# Patient Record
Sex: FEMALE | Race: WHITE | URBAN - METROPOLITAN AREA
[De-identification: names, ages, dates, MRNs, and addresses within clinical notes are randomized per-mention and may not be internally consistent; named-entity substitution may affect disease eponyms.]

---

## 2023-10-13 ENCOUNTER — OFFICE VISIT (OUTPATIENT)
Age: 33
End: 2023-10-13
Payer: MEDICAID

## 2023-10-13 VITALS
SYSTOLIC BLOOD PRESSURE: 122 MMHG | WEIGHT: 229.2 LBS | HEIGHT: 65 IN | DIASTOLIC BLOOD PRESSURE: 74 MMHG | BODY MASS INDEX: 38.19 KG/M2 | HEART RATE: 80 BPM

## 2023-10-13 DIAGNOSIS — R94.6 ABNORMAL THYROID FUNCTION TEST: ICD-10-CM

## 2023-10-13 DIAGNOSIS — R94.6 ABNORMAL THYROID FUNCTION TEST: Primary | ICD-10-CM

## 2023-10-13 PROCEDURE — 99203 OFFICE O/P NEW LOW 30 MIN: CPT | Performed by: GENERAL ACUTE CARE HOSPITAL

## 2023-10-13 RX ORDER — ETONOGESTREL AND ETHINYL ESTRADIOL VAGINAL .015; .12 MG/D; MG/D
1 RING VAGINAL
COMMUNITY

## 2023-10-13 RX ORDER — SEMAGLUTIDE 1.7 MG/.75ML
INJECTION, SOLUTION SUBCUTANEOUS
COMMUNITY
Start: 2023-09-26

## 2023-10-13 RX ORDER — FAMOTIDINE 10 MG/1
10 TABLET ORAL 2 TIMES DAILY PRN
COMMUNITY
Start: 2023-07-21

## 2023-10-13 NOTE — PROGRESS NOTES
REFERRED BY: No primary care provider on file. REASON: abnormal thyroid function    CHIEF COMPLAINT: abnormal thyroid blood test    HISTORY OF PRESENT ILLNESS:   Burke Martel is a 28 y.o. female with a PMHx as noted below who was referred to our endocrinology clinic for evaluation of abnormal thyroid function tests. Started on [place] 03/2023, weight 264 lbs when started, the TFTs were obtained prior to starting [facility] and on repeat showed similar results, so patient was sent to us for further evaluation of underlying issue. Denies symptoms of neck compression, hoarseness or neck enlargement. Older sister had thyroid problem older sister goiter, maternal aunt unclear diagnosis  No exposure to the neck radiation in childhood  Review of prior outside thyroid function as follows:     TPO AB 50    Patient denies any prior personal history of thyroid disorder. Family history of thyroid disorders is negative. Medication review: Patient denies having used any opiates or steroids which may interfere with the pituitary-thyroid axis. Furthermore they are not on amiodarone, biologic therapies, lithium, or other drugs that may interfere with thyroid function. The patient presents today wishing to learn what has caused their thyroid dysfunction. Review of most recent thyroid function:  No results found for: \"TSH\", \"FT4\", \"T4\", \"570040\", \"T3LT\", \"TSILT\", \"TRALT\", \"THYG\", \"TGAB\"   Thyroid Lab Key:  TSILT = Thyroid stimulating antibodies  TRALT = TSH Receptor Antibodies  TMCLT = TPO antibodies  T3LT = Total T3 levels  408471 = Direct FT4  521911 = Free T3    PAST MEDICAL/SURGICAL HISTORY:   No past medical history on file. No past surgical history on file. ALLERGIES:   Allergies   Allergen Reactions    Sulfa Antibiotics Rash       MEDICATIONS ON ADMISSION:   No current outpatient medications on file.     SOCIAL HISTORY:   Social History     Socioeconomic History    Marital status: Not on file

## 2023-10-13 NOTE — PATIENT INSTRUCTIONS
Your thyroid levels from before are reassuring. Plan to repeat your thyroid level today. Based on the results will decide on the next steps in management.

## 2023-10-14 LAB
T3FREE SERPL-MCNC: 3 PG/ML (ref 2.2–4)
T4 FREE SERPL-MCNC: 1.3 NG/DL (ref 0.8–1.5)
TSH SERPL DL<=0.05 MIU/L-ACNC: 1.65 UIU/ML (ref 0.36–3.74)

## 2023-10-15 LAB — THYROGLOB AB SERPL-ACNC: 2 IU/ML (ref 0–0.9)

## 2023-10-16 LAB — TSH RECEP AB SER-ACNC: <1.1 IU/L (ref 0–1.75)
